# Patient Record
Sex: MALE | Race: WHITE | Employment: OTHER | URBAN - METROPOLITAN AREA
[De-identification: names, ages, dates, MRNs, and addresses within clinical notes are randomized per-mention and may not be internally consistent; named-entity substitution may affect disease eponyms.]

---

## 2018-06-21 ENCOUNTER — OFFICE VISIT CONVERTED (OUTPATIENT)
Dept: NEUROLOGY | Facility: CLINIC | Age: 23
End: 2018-06-21
Attending: PSYCHIATRY & NEUROLOGY

## 2019-01-24 ENCOUNTER — OFFICE VISIT CONVERTED (OUTPATIENT)
Dept: NEUROLOGY | Facility: CLINIC | Age: 24
End: 2019-01-24
Attending: PSYCHIATRY & NEUROLOGY

## 2019-04-03 ENCOUNTER — HOSPITAL ENCOUNTER (OUTPATIENT)
Dept: LAB | Facility: HOSPITAL | Age: 24
Discharge: HOME | End: 2019-04-03

## 2019-04-03 LAB
ALBUMIN SERPL-MCNC: 5.1 G/DL (ref 3.5–5)
ALBUMIN/GLOB SERPL: 2 {RATIO} (ref 1.4–2.6)
ALP SERPL-CCNC: 73 U/L (ref 53–128)
ALT SERPL-CCNC: 18 U/L (ref 10–40)
ANION GAP SERPL CALC-SCNC: 19 MMOL/L (ref 8–19)
AST SERPL-CCNC: 15 U/L (ref 15–50)
BASOPHILS # BLD AUTO: 0.03 10*3/UL (ref 0–0.2)
BASOPHILS NFR BLD AUTO: 0.5 % (ref 0–3)
BILIRUB SERPL-MCNC: 1.29 MG/DL (ref 0.2–1.3)
BUN SERPL-MCNC: 24 MG/DL (ref 5–25)
BUN/CREAT SERPL: 23 {RATIO} (ref 6–20)
CALCIUM SERPL-MCNC: 9.7 MG/DL (ref 8.7–10.4)
CHLORIDE SERPL-SCNC: 101 MMOL/L (ref 99–111)
CONV ABS IMM GRAN: 0.01 10*3/UL (ref 0–0.2)
CONV CO2: 26 MMOL/L (ref 22–32)
CONV IMMATURE GRAN: 0.2 % (ref 0–1.8)
CONV TOTAL PROTEIN: 7.6 G/DL (ref 6.3–8.2)
CREAT UR-MCNC: 1.04 MG/DL (ref 0.7–1.2)
DEPRECATED RDW RBC AUTO: 38.1 FL (ref 35.1–43.9)
EOSINOPHIL # BLD AUTO: 0.21 10*3/UL (ref 0–0.7)
EOSINOPHIL # BLD AUTO: 3.3 % (ref 0–7)
ERYTHROCYTE [DISTWIDTH] IN BLOOD BY AUTOMATED COUNT: 11.9 % (ref 11.6–14.4)
GFR SERPLBLD BASED ON 1.73 SQ M-ARVRAT: >60 ML/MIN/{1.73_M2}
GLOBULIN UR ELPH-MCNC: 2.5 G/DL (ref 2–3.5)
GLUCOSE SERPL-MCNC: 94 MG/DL (ref 70–99)
HBA1C MFR BLD: 16.4 G/DL (ref 14–18)
HCT VFR BLD AUTO: 49.4 % (ref 42–52)
LYMPHOCYTES # BLD AUTO: 2.61 10*3/UL (ref 1–5)
MCH RBC QN AUTO: 29.3 PG (ref 27–31)
MCHC RBC AUTO-ENTMCNC: 33.2 G/DL (ref 33–37)
MCV RBC AUTO: 88.4 FL (ref 80–96)
MONOCYTES # BLD AUTO: 0.67 10*3/UL (ref 0.2–1.2)
MONOCYTES NFR BLD AUTO: 10.4 % (ref 3–10)
NEUTROPHILS # BLD AUTO: 2.89 10*3/UL (ref 2–8)
NEUTROPHILS NFR BLD AUTO: 44.9 % (ref 30–85)
NRBC CBCN: 0 % (ref 0–0.7)
OSMOLALITY SERPL CALC.SUM OF ELEC: 298 MOSM/KG (ref 273–304)
PLATELET # BLD AUTO: 229 10*3/UL (ref 130–400)
PMV BLD AUTO: 11.7 FL (ref 9.4–12.4)
POTASSIUM SERPL-SCNC: 3.7 MMOL/L (ref 3.5–5.3)
RBC # BLD AUTO: 5.59 10*6/UL (ref 4.7–6.1)
SODIUM SERPL-SCNC: 142 MMOL/L (ref 135–147)
VARIANT LYMPHS NFR BLD MANUAL: 40.7 % (ref 20–45)
WBC # BLD AUTO: 6.42 10*3/UL (ref 4.8–10.8)

## 2020-09-17 ENCOUNTER — OFFICE VISIT CONVERTED (OUTPATIENT)
Dept: NEUROLOGY | Facility: CLINIC | Age: 25
End: 2020-09-17
Attending: PSYCHIATRY & NEUROLOGY

## 2020-09-17 ENCOUNTER — CONVERSION ENCOUNTER (OUTPATIENT)
Dept: NEUROLOGY | Facility: CLINIC | Age: 25
End: 2020-09-17

## 2021-05-13 NOTE — PROGRESS NOTES
Progress Note      Patient Name: Ric Poole   Patient ID: [id number]   Sex: Male   YOB: 1995    Primary Care Provider: Kami ANTON   Referring Provider: Kami ANTON    Visit Date: September 17, 2020    Provider: Silvio Lombardo MD   Location: [facility]   Location Address: [address] [place] [address]  [id number]   Location Phone: [phone]          Chief Complaint     F/u seizures.       History Of Present Illness  Ric Poole is a 24 year old /White male who presents today to [facility] today referred from Kami ANTON.      24-year-old man here for follow-up of his seizures.  He states that he had 4 seizures his entire life.  He does not have any warning.  He wakes up in the ground.  People tell him that he jerks and then he is confused.  He states that he has had any seizures in the last 3 years.  He is taking Keppra 500 mg twice a day.  He lives with his mother.  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       Past Medical History  Epilepsy         Past Surgical History  *I have had no surgeries         Medication List  Keppra 500 mg oral tablet         Allergy List  NO KNOWN DRUG ALLERGIES         Family Medical History  Epilepsy (Seizures)         Social History  Alcohol (Never); lives with parents; Marijuana Use; Single; Tobacco (Light); Working         Immunizations  Name Date Admin   DTaP    DTaP    DTaP    DTaP    DTaP    Hepatitis B    Hepatitis B    Hepatitis B    Hib    Hib    Hib    Hib    IPV    IPV    IPV    IPV    Meningococcal (MNG)    MMR    MMR    Tdap    Varicella          Review of Systems  · Constitutional  o Denies  o : chills, excessive sweating, fatigue, fever, sycope/passing out, weight gain, weight loss  · Eyes  o Denies  o : changes in vision, blurry vision, double vision  · HENT  o Denies  o : loss of hearing, ringing in the ears, ear aches, sore throat, nasal  "congestion, sinus pain, nose bleeds, seasonal allergies  · Cardiovascular  o Denies  o : blood clots, swollen legs, anemia, easy burising or bleeding, transfusions  · Respiratory  o Denies  o : shortness of breath, dry cough, productive cough, pneumonia, COPD  · Gastrointestinal  o Denies  o : difficulty swallowing, reflux  · Genitourinary  o Denies  o : incontinence  · Neurologic  o Denies  o : headache, seizure, stroke, tremor, loss of balance, falls, dizziness/vertigo, difficulty with sleep, numbness/tingling/paresthesia , difficulty with coordination, difficulty with dexterity, weakness  · Musculoskeletal  o Admits  o : neck stiffness/pain  o Denies  o : swollen lymph nodes, muscle aches, joint pain, weakness, spasms, sciatica, pain radiating in arm, pain radiating in leg, low back pain  · Endocrine  o Denies  o : diabetes, thyroid disorder  · Psychiatric  o Denies  o : anxiety, depression      Vitals  Date Time BP Position Site L\R Cuff Size HR RR TEMP (F) WT  HT  BMI kg/m2 BSA m2 O2 Sat HC       09/17/2020 01:40 PM        97.5         09/17/2020 02:09 /68 Sitting    87 - R   150lbs 0oz 5'  8\" 22.81 1.81           Physical Examination     He is alert, fluent, phasic, follows commands well.  Optic disks are normal bilaterally, visual fields are full, EOMs full directions of gaze, facial strength is full.  There is no weakness of the upper or lower extremities.  Reflexes are normoactive and symmetrical in the biceps, triceps, patellar's and ankles.  Cerebellar testing is intact.  Station gait is able to tiptoe, heel walk, edna and tandem without difficulty.  Heart is regular in rhythm normal in rate           Assessment  · Generalized seizure disorder     345.90/G40.309  He is to continue taking Keppra 500 mg twice a day. I will see him again in 1 year's time for follow-up.    15 minutes was spent for this low complexity visit more than half the time was spent face-to-face with the patient for examination, " counseling, planning recommendations    Problems Reconciled  Plan  · Medications  o Medications have been Reconciled  o Transition of Care or Provider Policy  · Instructions  o Encouraged to follow-up with Primary Care Provider for preventative care.  o Follow up in 1 year.            Electronically Signed by: Silvio Lombardo MD -Author on September 17, 2020 02:36:42 PM

## 2021-05-14 VITALS
WEIGHT: 150 LBS | DIASTOLIC BLOOD PRESSURE: 68 MMHG | HEART RATE: 87 BPM | BODY MASS INDEX: 22.73 KG/M2 | TEMPERATURE: 97.5 F | SYSTOLIC BLOOD PRESSURE: 119 MMHG | HEIGHT: 68 IN

## 2021-05-15 VITALS
BODY MASS INDEX: 22.88 KG/M2 | HEIGHT: 68 IN | HEART RATE: 91 BPM | WEIGHT: 151 LBS | DIASTOLIC BLOOD PRESSURE: 70 MMHG | SYSTOLIC BLOOD PRESSURE: 124 MMHG

## 2021-05-16 VITALS
SYSTOLIC BLOOD PRESSURE: 132 MMHG | BODY MASS INDEX: 21.82 KG/M2 | HEART RATE: 93 BPM | HEIGHT: 68 IN | WEIGHT: 144 LBS | DIASTOLIC BLOOD PRESSURE: 74 MMHG

## 2021-09-30 RX ORDER — LEVETIRACETAM 500 MG/1
TABLET ORAL
Qty: 180 TABLET | OUTPATIENT
Start: 2021-09-30

## 2021-09-30 NOTE — TELEPHONE ENCOUNTER
Refill denied. Pt no-showed for last appointment on 09/17/2021. Pharmacy advised to notify pt that he needs an appointment on the schedule before we can fill it.

## 2021-10-06 ENCOUNTER — TELEPHONE (OUTPATIENT)
Dept: NEUROLOGY | Facility: CLINIC | Age: 26
End: 2021-10-06

## 2021-10-06 RX ORDER — LEVETIRACETAM 500 MG/1
TABLET ORAL
Qty: 60 TABLET | Refills: 0 | Status: SHIPPED | OUTPATIENT
Start: 2021-10-06 | End: 2021-10-22 | Stop reason: SDUPTHER

## 2021-10-06 NOTE — TELEPHONE ENCOUNTER
This is being addressed within a separate telephone/refill encounter and the request has been sent to [facility] for review.

## 2021-10-06 NOTE — TELEPHONE ENCOUNTER
He no-showed for yearly f/u on 09/17/2021 but has been rescheduled for 10/22/2021. Last office note says to continue Keppra 500mg bid and see in 1 year. Refill request sent to [facility] for review.

## 2021-10-06 NOTE — TELEPHONE ENCOUNTER
Caller: Ric Poole    Relationship: Self      Medication requested (name and dosage): levETIRAcetam (KEPPRA) 500 MG tablet [Pharmacy Med Name: levETIRAcetam 500 MG TABLET] [[id number]]     Order Details  Dose, Route, Frequency: As Directed   Dispense Quantity: 180 tablet Refills: --          Sig: TAKE ONE TABLET BY MOUTH TWICE A DAY         Pharmacy where request should be sent: [facility]    Additional details provided by patient: N/A    Best call back number: [phone]    Does the patient have less than a 3 day supply:  [x] Yes  [] No    Patrick GUTIERREZ Rep   10/06/21 08:48 EDT

## 2021-10-22 ENCOUNTER — OFFICE VISIT (OUTPATIENT)
Dept: NEUROLOGY | Facility: CLINIC | Age: 26
End: 2021-10-22

## 2021-10-22 VITALS
WEIGHT: 150 LBS | HEIGHT: 68 IN | BODY MASS INDEX: 22.73 KG/M2 | HEART RATE: 75 BPM | DIASTOLIC BLOOD PRESSURE: 70 MMHG | SYSTOLIC BLOOD PRESSURE: 119 MMHG

## 2021-10-22 DIAGNOSIS — G40.409 GENERALIZED TONIC-CLONIC SEIZURE (HCC): Primary | ICD-10-CM

## 2021-10-22 PROCEDURE — 99212 OFFICE O/P EST SF 10 MIN: CPT | Performed by: PSYCHIATRY & NEUROLOGY

## 2021-10-22 RX ORDER — LEVETIRACETAM 500 MG/1
500 TABLET ORAL 2 TIMES DAILY
Qty: 60 TABLET | Refills: 6 | Status: SHIPPED | OUTPATIENT
Start: 2021-10-22

## 2021-10-22 NOTE — ASSESSMENT & PLAN NOTE
I discussed with him that he can continue taking levetiracetam or I could wean him off it if he chooses.  I discussed with him that if I wean him off the levetiracetam he cannot drive for 2 months while on weaning off the medication.  I would wean it by taking 500 mg daily for 2 weeks then every other day for 2 weeks and stop taking levetiracetam.  He chooses to continue taking levetiracetam at this time.  He will follow-up with his primary care provider in the future to get it refilled.  He should get periodic laboratory work-up as well CBC and compressive metabolic profile.

## 2021-10-22 NOTE — PROGRESS NOTES
"Chief Complaint  Seizures    Subjective          Ric Rakan Poole is a 25 y.o. male who presents to [facility]  History of Present Illness  25-year-old man here for follow for seizures.  He has not had any seizures for 5 years but had generalized tonic-clonic seizure.  He states that he must have started taking antiepileptic medications on the second seizure and then to stop taking and had a third and fourth seizure while off medication.  He is taking levetiracetam 500 mg twice a day.  He has no adverse effects.  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  He gets his laboratory work-up checked periodically.      Vital Signs:   /70 (BP Location: Left arm, Patient Position: Sitting, Cuff Size: Adult)   Pulse 75   Ht 172.7 cm (67.99\")   Wt 68 kg (150 lb)   BMI 22.81 kg/m²     Physical Exam   He is alert, fluent, phasic, follows commands well.  Optic disc are normal bilaterally.  Facial strength is full, soft palate elevation and tongue are normal.  There is no tongue bite.  Heart is regular with a normal in rate.        Assessment and Plan  Diagnoses and all orders for this visit:    1. Generalized tonic-clonic seizure (HCC) (Primary)  Assessment & Plan:  I discussed with him that he can continue taking levetiracetam or I could wean him off it if he chooses.  I discussed with him that if I wean him off the levetiracetam he cannot drive for 2 months while on weaning off the medication.  I would wean it by taking 500 mg daily for 2 weeks then every other day for 2 weeks and stop taking levetiracetam.  He chooses to continue taking levetiracetam at this time.  He will follow-up with his primary care provider in the future to get it refilled.  He should get periodic laboratory work-up as well CBC and compressive metabolic profile.         Total time spent with the patient and coordinating patient care was 15 minutes.    Follow Up  No follow-ups on " file.  Patient was given instructions and counseling regarding his condition or for health maintenance advice. Please see specific information pulled into the AVS if appropriate.